# Patient Record
Sex: FEMALE | Race: BLACK OR AFRICAN AMERICAN | NOT HISPANIC OR LATINO | Employment: FULL TIME | URBAN - METROPOLITAN AREA
[De-identification: names, ages, dates, MRNs, and addresses within clinical notes are randomized per-mention and may not be internally consistent; named-entity substitution may affect disease eponyms.]

---

## 2024-10-30 DIAGNOSIS — Z01.818 OTHER SPECIFIED PRE-OPERATIVE EXAMINATION: Primary | ICD-10-CM

## 2024-10-30 DIAGNOSIS — Z12.31 OTHER SCREENING MAMMOGRAM: ICD-10-CM

## 2024-11-04 ENCOUNTER — HOSPITAL ENCOUNTER (OUTPATIENT)
Dept: RADIOLOGY | Facility: HOSPITAL | Age: 49
Discharge: HOME | End: 2024-11-04
Attending: NURSE PRACTITIONER
Payer: COMMERCIAL

## 2024-11-04 DIAGNOSIS — Z01.818 OTHER SPECIFIED PRE-OPERATIVE EXAMINATION: ICD-10-CM

## 2024-11-06 ENCOUNTER — HOSPITAL ENCOUNTER (OUTPATIENT)
Dept: RADIOLOGY | Facility: HOSPITAL | Age: 49
Discharge: HOME | End: 2024-11-06
Attending: NURSE PRACTITIONER
Payer: COMMERCIAL

## 2024-11-06 DIAGNOSIS — Z76.89 REFERRAL OF PATIENT WITHOUT EXAMINATION OR TREATMENT: Primary | ICD-10-CM

## 2024-11-06 DIAGNOSIS — Z76.89 PERSONS ENCOUNTERING HEALTH SERVICES IN OTHER SPECIFIED CIRCUMSTANCES: ICD-10-CM

## 2024-11-06 DIAGNOSIS — Z76.89 PERSONS ENCOUNTERING HEALTH SERVICES IN OTHER SPECIFIED CIRCUMSTANCES: Primary | ICD-10-CM

## 2024-11-06 PROCEDURE — 71046 X-RAY EXAM CHEST 2 VIEWS: CPT | Mod: TC,PO

## 2024-11-06 PROCEDURE — 71046 X-RAY EXAM CHEST 2 VIEWS: CPT | Mod: 26,,, | Performed by: RADIOLOGY

## 2024-12-10 ENCOUNTER — TELEPHONE (OUTPATIENT)
Dept: CARDIOLOGY | Facility: CLINIC | Age: 49
End: 2024-12-10
Payer: COMMERCIAL

## 2024-12-10 NOTE — TELEPHONE ENCOUNTER
----- Message from Angelique sent at 12/10/2024 10:40 AM CST -----  Contact: pt [phone]  Type:  Sooner Appointment Request    Caller is requesting a sooner appointment.  Caller declined first available appointment listed below.  Caller will not accept being placed on the waitlist and is requesting a message be sent to doctor.    Name of Caller:  Pt   When is the first available appointment?  NA   Symptoms:  pre op clearance   Would the patient rather a call back or a response via [facility]? Call   Best Call Back Number:  [phone]    Additional Information:  Pls call back and advise

## 2024-12-19 ENCOUNTER — OFFICE VISIT (OUTPATIENT)
Dept: CARDIOLOGY | Facility: CLINIC | Age: 49
End: 2024-12-19
Payer: COMMERCIAL

## 2024-12-19 ENCOUNTER — TELEPHONE (OUTPATIENT)
Dept: CARDIOLOGY | Facility: CLINIC | Age: 49
End: 2024-12-19

## 2024-12-19 VITALS
WEIGHT: 293 LBS | SYSTOLIC BLOOD PRESSURE: 138 MMHG | BODY MASS INDEX: 47.09 KG/M2 | HEART RATE: 104 BPM | HEIGHT: 66 IN | OXYGEN SATURATION: 98 % | DIASTOLIC BLOOD PRESSURE: 70 MMHG

## 2024-12-19 DIAGNOSIS — R07.9 CHEST PAIN, UNSPECIFIED TYPE: ICD-10-CM

## 2024-12-19 DIAGNOSIS — Z01.818 PRE-OP EVALUATION: Primary | ICD-10-CM

## 2024-12-19 DIAGNOSIS — Z01.818 ENCOUNTER FOR OTHER PREPROCEDURAL EXAMINATION: ICD-10-CM

## 2024-12-19 DIAGNOSIS — I10 ESSENTIAL HYPERTENSION: ICD-10-CM

## 2024-12-19 DIAGNOSIS — R60.0 PEDAL EDEMA: ICD-10-CM

## 2024-12-19 PROCEDURE — 3078F DIAST BP <80 MM HG: CPT | Mod: CPTII,S$GLB,, | Performed by: INTERNAL MEDICINE

## 2024-12-19 PROCEDURE — 3075F SYST BP GE 130 - 139MM HG: CPT | Mod: CPTII,S$GLB,, | Performed by: INTERNAL MEDICINE

## 2024-12-19 PROCEDURE — 99204 OFFICE O/P NEW MOD 45 MIN: CPT | Mod: S$GLB,,, | Performed by: INTERNAL MEDICINE

## 2024-12-19 PROCEDURE — 99999 PR PBB SHADOW E&M-EST. PATIENT-LVL III: CPT | Mod: PBBFAC,,, | Performed by: INTERNAL MEDICINE

## 2024-12-19 PROCEDURE — 1159F MED LIST DOCD IN RCRD: CPT | Mod: CPTII,S$GLB,, | Performed by: INTERNAL MEDICINE

## 2024-12-19 PROCEDURE — 3008F BODY MASS INDEX DOCD: CPT | Mod: CPTII,S$GLB,, | Performed by: INTERNAL MEDICINE

## 2024-12-19 RX ORDER — AMLODIPINE BESYLATE 10 MG/1
TABLET ORAL DAILY
COMMUNITY
Start: 2024-10-30 | End: 2024-12-19

## 2024-12-19 RX ORDER — LOSARTAN POTASSIUM 50 MG/1
50 TABLET ORAL DAILY
Qty: 90 TABLET | Refills: 3 | Status: SHIPPED | OUTPATIENT
Start: 2024-12-19 | End: 2025-12-19

## 2024-12-19 RX ORDER — HYDROCHLOROTHIAZIDE 25 MG/1
TABLET ORAL DAILY
COMMUNITY
Start: 2024-11-13

## 2024-12-19 NOTE — PROGRESS NOTES
[facility]  [place]    Subjective:     Patient ID:  Misty Bonner is a 49 y.o. female patient here for evaluation No chief complaint on file.      HPI:  49-year-old female here for preop clearance.  Patient needs weight loss surgery.  Patient does not report any significant exertional symptoms with routine activities of daily life including walking through a grocery store.  Does have pedal edema.  She is taking amlodipine and hydrochlorothiazide.    Review of Systems   All other systems reviewed and are negative.       No past medical history on file.    No past surgical history on file.    No family history on file.    Social History     Socioeconomic History    Marital status: Single     Social Drivers of Health     Financial Resource Strain: Low Risk  (12/18/2024)    Overall Financial Resource Strain (CARDIA)     Difficulty of Paying Living Expenses: Not hard at all   Food Insecurity: No Food Insecurity (12/18/2024)    Hunger Vital Sign     Worried About Running Out of Food in the Last Year: Never true     Ran Out of Food in the Last Year: Never true   Physical Activity: Inactive (12/18/2024)    Exercise Vital Sign     Days of Exercise per Week: 0 days     Minutes of Exercise per Session: 0 min   Stress: No Stress Concern Present (12/18/2024)    [employer] [facility] - Occupational Stress Questionnaire     Feeling of Stress : Only a little   Housing Stability: Unknown (12/18/2024)    Housing Stability Vital Sign     Unable to Pay for Housing in the Last Year: No       Current Outpatient Medications   Medication Sig Dispense Refill    hydroCHLOROthiazide (HYDRODIURIL) 25 MG tablet once daily.      losartan (COZAAR) 50 MG tablet Take 1 tablet (50 mg total) by mouth once daily. 90 tablet 3     No current facility-administered medications for this visit.       Review of patient's allergies indicates:  No Known Allergies      Objective:     "    Vitals:    12/19/24 1418   BP: 138/70   Pulse: 104       Physical Exam  Vitals reviewed.   Constitutional:       Appearance: Normal appearance. She is obese.   Cardiovascular:      Rate and Rhythm: Normal rate and regular rhythm.      Pulses: Normal pulses.      Heart sounds: Normal heart sounds. No murmur heard.     No gallop.   Pulmonary:      Effort: Pulmonary effort is normal.      Breath sounds: Normal breath sounds.   Skin:     General: Skin is warm.   Neurological:      General: No focal deficit present.      Mental Status: She is alert and oriented to person, place, and time.         LIPIDS - LAST 2   No results found for: "CHOL", "HDL", "LDLCALC", "TRIG", "CHOLHDL"    CBC - LAST 2  No results found for: "WBC", "RBC", "HGB", "HCT", "MCV", "MCH", "MCHC", "RDW", "PLT", "MPV", "GRAN", "LYMPH", "MONO", "BASO", "NRBC"    CHEMISTRY & LIVER FUNCTION - LAST 2  No results found for: "NA", "K", "CL", "CO2", "ANIONGAP", "BUN", "CREATININE", "GLU", "CALCIUM", "PH", "MG", "ALBUMIN", "PROT", "ALKPHOS", "ALT", "AST", "BILITOT"     CARDIAC PROFILE - LAST 2  No results found for: "BNP", "CPK", "CPKMB", "LDH", "TROPONINI"     COAGULATION - LAST 2  No results found for: "LABPT", "INR", "APTT"    ENDOCRINE & PSA - LAST 2  No results found for: "HGBA1C", "MICROALBUR", "TSH", "PROCAL", "PSA"     ECHOCARDIOGRAM RESULTS  No results found for this or any previous visit.      CURRENT/PREVIOUS VISIT EKG  Results for orders placed or performed in visit on 11/06/24   EKG 12-lead    Collection Time: 11/06/24  2:04 PM   Result Value Ref Range    QRS Duration 76 ms    [facility] QTC Calculation 437 ms    Narrative    Test Reason : Z76.89,    Vent. Rate : 096 BPM     Atrial Rate : 096 BPM     P-R Int : 170 ms          QRS Dur : 076 ms      QT Int : 346 ms       P-R-T Axes : 048 -07 036 degrees     QTc Int : 437 ms    Normal sinus rhythm  Normal ECG  No previous ECGs available  Confirmed by Anne KIRBY, Abena Nelson ([id number]) on 11/6/2024 8:07:28 " PM    Referred By: ERBEKAH DUFFY           Confirmed By:Abena Rodríguez MD     No valid procedures specified.   No results found for this or any previous visit.    No valid procedures specified.        Assessment:       1. Pre-op evaluation    2. Essential hypertension    3. Pedal edema    4. Chest pain, unspecified type    5. Encounter for other preprocedural examination           Plan:       Pre-op evaluation  -     IN OFFICE EKG 12-LEAD (to Muse)    Essential hypertension  -     losartan (COZAAR) 50 MG tablet; Take 1 tablet (50 mg total) by mouth once daily.  Dispense: 90 tablet; Refill: 3    Pedal edema    Chest pain, unspecified type  -     NM Myocardial Perfusion Spect Multi Pharmacologic; Future; Expected date: 12/19/2024    Encounter for other preprocedural examination  -     NM Myocardial Perfusion Spect Multi Pharmacologic; Future; Expected date: 12/19/2024  -     Nuclear Stress Test; Future    Will get a stress test. Will send clearance letter to  When stress test report is available.  Patient does not remember the exact name of the surgeon and the practice, we will call her tomorrow to get that information.    Leg swelling could be due to venostasis from obesity versus from amlodipine.  Patient would like to switch the antihypertensive and monitor.  Patient brings her labs in and her creatinine was normal in November.  Will switch to losartan and monitor.  Patient to follow-up in about 8 weeks to further evaluate her pedal edema and antihypertensives.    Follow up in about 8 weeks (around 2/13/2025) for f/u NP in clinic.          MD [facility]  [place]

## 2024-12-19 NOTE — LETTER
2024    Misty Bonner  [address] [place] [address]             [facility]  [address]  [address]  [place] [address]  Phone: [phone]  Fax: [phone] Patient: Misty Bonner  : 1975  Referring Doctor: Dr Donald Schwab   [facility]   Fax - [phone]  Phone- [phone]     Date of Last Office Visit: 2024     Current Outpatient Medications   Medication Sig    hydroCHLOROthiazide (HYDRODIURIL) 25 MG tablet once daily.    losartan (COZAAR) 50 MG tablet Take 1 tablet (50 mg total) by mouth once daily.     No current facility-administered medications for this visit.       This patient has been assessed for risk factors for clearance of surgery with the following stipulations:    ___ No contraindications  ___ Recommendations for antiplatelet/anticoagulant medications:  _x__  Low risk _x__ , Moderate risk ___ , High risk ____ .   _x__ Cleared for surgery with the following contraindications/precautions:n/a  ___ Not cleared for surgery due to the following reasons:      If you have any questions regarding the above, please contact my office at [phone].    Sincerely,           FRANCISCO JAVIER Goodson, [facility]ISH[facility]

## 2024-12-19 NOTE — TELEPHONE ENCOUNTER
Cardiac clearance / gastric sleeve      Referring Doctor: Dr Donald Schwab   [facility]   Fax - [phone]  Phone- [phone]         Date of Last Office Visit: 12/19/2024

## 2024-12-20 LAB
OHS QRS DURATION: 88 MS
OHS QTC CALCULATION: 464 MS

## 2024-12-23 ENCOUNTER — TELEPHONE (OUTPATIENT)
Dept: CARDIOLOGY | Facility: CLINIC | Age: 49
End: 2024-12-23
Payer: COMMERCIAL

## 2024-12-23 NOTE — TELEPHONE ENCOUNTER
----- Message from Aster sent at 12/23/2024 10:04 AM CST -----  Contact: Patient  Type:  Needs Medical Advice    Who Called: Patient      Would the patient rather a call back or a response via [facility]? Call    Best Call Back Number: [phone]    Additional Information: Patient is requesting a call regarding testing. Please call to advise

## 2024-12-26 ENCOUNTER — TELEPHONE (OUTPATIENT)
Dept: CARDIOLOGY | Facility: CLINIC | Age: 49
End: 2024-12-26
Payer: COMMERCIAL

## 2024-12-26 NOTE — TELEPHONE ENCOUNTER
----- Message from Jorge sent at 12/26/2024 11:13 AM CST -----  Type: Needs Medical Advice  Who Called:  pt  Pharmacy name and phone #:    Best Call Back Number: [phone]  Additional Information: pt is calling the office for a call back to discuss tests that was ordered she is scheduled to do them next Thursday and Friday, just has some questions , please call pt back thanks

## 2024-12-27 ENCOUNTER — PATIENT MESSAGE (OUTPATIENT)
Dept: CARDIOLOGY | Facility: CLINIC | Age: 49
End: 2024-12-27
Payer: COMMERCIAL

## 2024-12-27 ENCOUNTER — TELEPHONE (OUTPATIENT)
Dept: CARDIOLOGY | Facility: CLINIC | Age: 49
End: 2024-12-27
Payer: COMMERCIAL

## 2024-12-27 NOTE — TELEPHONE ENCOUNTER
----- Message from Shruti sent at 12/27/2024  8:27 AM CST -----  Contact: self  Type: Needs Medical Advice  Who Called:  the patient     Best Call Back Number: [phone]  Additional Information: pt says she has no missed calls, and is asking the nurse to please call her as she would like to discuss upcoming tests.

## 2024-12-30 ENCOUNTER — TELEPHONE (OUTPATIENT)
Dept: CARDIOLOGY | Facility: CLINIC | Age: 49
End: 2024-12-30
Payer: COMMERCIAL

## 2024-12-30 NOTE — TELEPHONE ENCOUNTER
----- Message from Hudson sent at 12/30/2024 12:58 PM CST -----  Regarding: return call  Contact: patient  Type:  Patient Returning Call    Who Called:patient  Who Left Message for Patient:nurse  Does the patient know what this is regarding?:test/ why do patient have two test/ Can she just do one?  Would the patient rather a call back or a response via [facility]? Please advise  Best Call Back Number:[phone]  Additional Information:

## 2024-12-31 ENCOUNTER — TELEPHONE (OUTPATIENT)
Dept: CARDIOLOGY | Facility: HOSPITAL | Age: 49
End: 2024-12-31

## 2025-01-02 ENCOUNTER — CLINICAL SUPPORT (OUTPATIENT)
Dept: CARDIOLOGY | Facility: HOSPITAL | Age: 50
End: 2025-01-02
Attending: INTERNAL MEDICINE
Payer: COMMERCIAL

## 2025-01-02 ENCOUNTER — HOSPITAL ENCOUNTER (OUTPATIENT)
Dept: RADIOLOGY | Facility: HOSPITAL | Age: 50
Discharge: HOME | End: 2025-01-02
Attending: INTERNAL MEDICINE
Payer: COMMERCIAL

## 2025-01-02 DIAGNOSIS — Z01.818 ENCOUNTER FOR OTHER PREPROCEDURAL EXAMINATION: ICD-10-CM

## 2025-01-02 DIAGNOSIS — R07.9 CHEST PAIN, UNSPECIFIED TYPE: ICD-10-CM

## 2025-01-02 PROCEDURE — A9502 TC99M TETROFOSMIN: HCPCS | Performed by: INTERNAL MEDICINE

## 2025-01-02 PROCEDURE — 93016 CV STRESS TEST SUPVJ ONLY: CPT | Mod: ,,, | Performed by: INTERNAL MEDICINE

## 2025-01-02 PROCEDURE — 78452 HT MUSCLE IMAGE SPECT MULT: CPT | Mod: TC

## 2025-01-02 PROCEDURE — 93017 CV STRESS TEST TRACING ONLY: CPT

## 2025-01-02 PROCEDURE — 93018 CV STRESS TEST I&R ONLY: CPT | Mod: ,,, | Performed by: INTERNAL MEDICINE

## 2025-01-02 PROCEDURE — 63600175 PHARM REV CODE 636 W HCPCS: Performed by: INTERNAL MEDICINE

## 2025-01-02 RX ORDER — REGADENOSON 0.08 MG/ML
0.4 INJECTION, SOLUTION INTRAVENOUS ONCE
Status: COMPLETED | OUTPATIENT
Start: 2025-01-02 | End: 2025-01-02

## 2025-01-02 RX ADMIN — REGADENOSON 0.4 MG: 0.08 INJECTION, SOLUTION INTRAVENOUS at 08:01

## 2025-01-02 RX ADMIN — TETROFOSMIN 24.9 MILLICURIE: 0.23 INJECTION, POWDER, LYOPHILIZED, FOR SOLUTION INTRAVENOUS at 08:01

## 2025-01-03 ENCOUNTER — HOSPITAL ENCOUNTER (OUTPATIENT)
Dept: RADIOLOGY | Facility: HOSPITAL | Age: 50
Discharge: HOME | End: 2025-01-03
Attending: INTERNAL MEDICINE
Payer: COMMERCIAL

## 2025-01-03 LAB
CV PHARM DOSE: 0.4 MG
CV STRESS BASE HR: 91 BPM
DIASTOLIC BLOOD PRESSURE: 91 MMHG
OHS CV CPX 1 MINUTE RECOVERY HEART RATE: 103 BPM
OHS CV CPX 85 PERCENT MAX PREDICTED HEART RATE MALE: 144
OHS CV CPX MAX PREDICTED HEART RATE: 169
OHS CV CPX PATIENT IS FEMALE: 1
OHS CV CPX PATIENT IS MALE: 0
OHS CV CPX PEAK DIASTOLIC BLOOD PRESSURE: 90 MMHG
OHS CV CPX PEAK HEAR RATE: 104 BPM
OHS CV CPX PEAK RATE PRESSURE PRODUCT: NORMAL
OHS CV CPX PEAK SYSTOLIC BLOOD PRESSURE: 142 MMHG
OHS CV CPX PERCENT MAX PREDICTED HEART RATE ACHIEVED: 65
OHS CV CPX RATE PRESSURE PRODUCT PRESENTING: NORMAL
SYSTOLIC BLOOD PRESSURE: 144 MMHG

## 2025-01-03 PROCEDURE — A9502 TC99M TETROFOSMIN: HCPCS | Performed by: INTERNAL MEDICINE

## 2025-01-03 RX ADMIN — TETROFOSMIN 26.1 MILLICURIE: 0.23 INJECTION, POWDER, LYOPHILIZED, FOR SOLUTION INTRAVENOUS at 08:01

## 2025-01-06 ENCOUNTER — TELEPHONE (OUTPATIENT)
Dept: CARDIOLOGY | Facility: CLINIC | Age: 50
End: 2025-01-06
Payer: COMMERCIAL

## 2025-01-06 NOTE — TELEPHONE ENCOUNTER
----- Message from Nhung sent at 1/6/2025 11:20 AM CST -----  Contact: self  Type: Needs Medical Advice  Who Called:  pt  Best Call Back Number: [phone]   Additional Information: pt states that she needs medical clearance to be sent to [facility] phone [phone] and fax [phone] so pt can get her surgery date scheduled.please advise

## 2025-01-08 NOTE — TELEPHONE ENCOUNTER
----- Message from Savanah sent at 1/8/2025 10:07 AM CST -----  Type: Needs Medical Advice  Who Called:  pt     Best Call Back Number: [phone] (home)     Additional Information: pt requesting call back in regards to needing her clearance sent over please advise

## 2025-01-08 NOTE — TELEPHONE ENCOUNTER
----- Message from Ana sent at 1/8/2025  2:30 PM CST -----  Contact: [facility] Kendy Fields  Type: Needs Medical Advice         Who Called: [facility] Kendy Fields    Best Call Back Number: [phone]    Additional Information: Requesting a call back regarding  They are asking if the office to send the Clearance once complete.  They are asking if labs can be done also  CBC and CMP.     Fax# [phone]   Attn: Diamond     Please Advise- Thank you   stated

## 2025-01-08 NOTE — TELEPHONE ENCOUNTER
----- Message from Fabiola sent at 1/8/2025  2:51 PM CST -----  Type:  Needs Medical Advice    Who Called: pt     Would the patient rather a call back or a response via [facility]? Call back     Best Call Back Number: [phone]      Additional Information: pt stated that she is has called the last to days and know one has returned her call she is trying to get her surgery  scheduled    Please call back to advise. Thanks!

## 2025-01-27 ENCOUNTER — TELEPHONE (OUTPATIENT)
Dept: CARDIOLOGY | Facility: CLINIC | Age: 50
End: 2025-01-27
Payer: COMMERCIAL

## 2025-01-27 NOTE — TELEPHONE ENCOUNTER
----- Message from Magdalena sent at 1/27/2025 12:29 PM CST -----  Contact: PHYSICIAN LEROY LOPEZ  Type:  Needs Medical Advice    Who Called: PHYSICIAN LEROY LOPEZ  Symptoms (please be specific): OFFICE RECEIVED ALL CLEARANCE INFORMATION EXCEPT FOR THE:  1. LAST LAB RESULTS   2. LAST EKG    PLEASE FWD THOSE RESULTS ASAP.   How long has patient had these symptoms:  N/A  Pharmacy name and phone #:  N/A  Would the patient rather a call back or a response via [facility]? CALL   Best Call Back Number:  [phone] / FAX # [phone]  ATTN: JOHN   Additional Information: CALLER ASK THAT THE OFFICE GIVE HER A CALL TO DISCUSS LAB RESULTS   THANK YOU